# Patient Record
Sex: MALE | NOT HISPANIC OR LATINO | URBAN - METROPOLITAN AREA
[De-identification: names, ages, dates, MRNs, and addresses within clinical notes are randomized per-mention and may not be internally consistent; named-entity substitution may affect disease eponyms.]

---

## 2021-03-18 ENCOUNTER — OFFICE VISIT CONVERTED (OUTPATIENT)
Dept: SURGERY | Facility: CLINIC | Age: 56
End: 2021-03-18
Attending: SURGERY

## 2021-03-19 ENCOUNTER — HOSPITAL ENCOUNTER (OUTPATIENT)
Dept: PREADMISSION TESTING | Facility: HOSPITAL | Age: 56
Discharge: HOME | End: 2021-03-19
Attending: SURGERY

## 2021-03-21 LAB — SARS-COV-2 RNA SPEC QL NAA+PROBE: NOT DETECTED

## 2021-03-25 ENCOUNTER — HOSPITAL ENCOUNTER (OUTPATIENT)
Dept: PERIOP | Facility: HOSPITAL | Age: 56
Setting detail: HOSPITAL OUTPATIENT SURGERY
Discharge: HOME | End: 2021-03-25
Attending: SURGERY

## 2021-04-02 ENCOUNTER — OFFICE VISIT CONVERTED (OUTPATIENT)
Dept: SURGERY | Facility: CLINIC | Age: 56
End: 2021-04-02
Attending: SURGERY

## 2021-05-10 NOTE — H&P
"   History and Physical      Patient Name: Castillo Sandoval   Patient ID: [id number]   Sex: Male   YOB: 1965        Visit Date: March 18, 2021    Provider: Ivan Amor MD   [facility] [facility]   Location Address: [address] [place] [address]  [id number]   Location Phone: [phone]          Chief Complaint  · Outpatient History & Physical / Surgical Orders  · Ganglion cyst      History Of Present Illness     Mr. Sandoval is a 55-year-old male who presents with a ganglion cyst on his right wrist. He noted this occurred without any trauma. He works on his farm.       Past Medical History  ***No Significant Medical History         Past Surgical History  *I have had no surgeries         Allergy List  Codiene       Allergies Reconciled  Family Medical History  *No Known Family History         Social History  Tobacco (Current every day)         Review of Systems  · Cardiovascular  o Denies  o : chest pain on exertion, shortness of breath, lower extremity swelling  · Respiratory  o Denies  o : wheezing, chronic cough, coughing up blood  · Gastrointestinal  o Denies  o : diarrhea, chronic abdominal pain, reflux symptoms      Vitals  Date Time BP Position Site L\R Cuff Size HR RR TEMP (F) WT  HT  BMI kg/m2 BSA m2 O2 Sat FR L/min FiO2 HC       03/18/2021 01:32 PM       14  174lbs 16oz 5'  9\" 25.84 1.97             Physical Examination  · Constitutional  o Appearance  o : reveals patient to be in no acute distress  · Head and Face  o HEENT  o : shows sclera to be nonicteric  · Respiratory  o Respiratory  o : chest is clear  · Cardiovascular  o Heart  o : regular rate and rhythm without murmurs, gallops or rubs  · Gastrointestinal  o Abdominal Examination  o :   § Abdomen  § : abdomen is soft and nontender, bowel sounds are present, no masses, gaurding or rebound were noted   · Musculoskeletal  o Extremeties/Joint  o : extremities show a ful range of motion  · Neurologic  o Neurologic/Reflexes  o : " intact          Assessment  · Pre-op testing     V72.84/Z01.818  · Ganglion cyst     727.43/M67.40  Right Wrist      Plan  · Orders  o [facility] Pre-Op Covid-19 Screening (72060) - V72.84/Z01.818 - 03/19/2021   3/19/21 @12:30pm. [address]  o Surgery Order ([facility]) - 727.43/M67.40 - 03/25/2021  · Medications  o Medications have been Reconciled  o Transition of Care or Provider Policy  · Instructions  o PLAN:  o Handouts Provided-Pre-Procedure Instructions including date and time and location of procedure.  o Surgical Facility: [facility]  o ****Surgical Orders****  o RISK AND BENEFITS:  o Consent for surgery: Given these options, the patient has verbally expressed an understanding of the risks of surgery and finds these risks acceptable. We will proceed with surgery as soon as possible.  o Consult Anesthesia for any post operative block, or any pain management procedure deemed necessary by the anesthesiologist for adequate post-operative pain control.  o O.R. PREP: Per protocol  o PLEASE SIGN PERMIT FOR: Excision of Right Wrist Ganglion Cyst  o Kefzol 1 gram IV on call to OR.  o The above History and Physical Examination has been completed within 30 days of admission.  o ****Patient Status****  o Outpatient  o Pre-Admission Testing Date: Phone Screen 3/23/21 @12:30pm  o Electronically Identified Patient Education Materials Provided Electronically            Electronically Signed by: Anum Anderson-, -Author on March 23, 2021 09:34:03 AM  Electronically Co-signed by: Ivan Amor MD -Reviewer on March 25, 2021 08:41:13 AM

## 2021-05-14 VITALS — RESPIRATION RATE: 14 BRPM | HEIGHT: 69 IN | BODY MASS INDEX: 25.92 KG/M2 | WEIGHT: 175 LBS

## 2021-05-14 VITALS — WEIGHT: 175 LBS | BODY MASS INDEX: 25.92 KG/M2 | HEIGHT: 69 IN

## 2021-05-14 NOTE — PROGRESS NOTES
"   Progress Note      Patient Name: Castillo Sandoval   Patient ID: [id number]   Sex: Male   YOB: 1965    Primary Care Provider: Ignacia Robles MD    Visit Date: April 2, 2021    Provider: Ivan Amor MD   Location: [facility]   Location Address: [address] [place] [address]  [id number]   Location Phone: [phone]          Chief Complaint  · Follow Up Surgery      History Of Present Illness     Mr. Sandoval is seen in follow-up status post right wrist exploration for a ganglion cyst.       Past Medical History  ***No Significant Medical History         Past Surgical History  *I have had no surgeries         Allergy List  Codiene         Family Medical History  *No Known Family History         Social History  Tobacco (Current every day)         Review of Systems  · Cardiovascular  o Denies  o : chest pain on exertion, shortness of breath, lower extremity swelling  · Respiratory  o Denies  o : wheezing, chronic cough, coughing up blood  · Gastrointestinal  o Denies  o : diarrhea, chronic abdominal pain, reflux symptoms      Vitals  Date Time BP Position Site L\R Cuff Size HR RR TEMP (F) WT  HT  BMI kg/m2 BSA m2 O2 Sat FR L/min FiO2 HC       04/02/2021 01:30 PM         174lbs 16oz 5'  9\" 25.84 1.97             Physical Examination     His swelling is down. His sutures were removed.           Assessment  · Encounter for examination following surgery     V67.00/Z09      Plan  · Medications  o Medications have been Reconciled  o Transition of Care or Provider Policy  · Instructions  o F/U PRN  o Electronically Identified Patient Education Materials Provided Electronically            Electronically Signed by: Anum Anderson-, -Author on April 7, 2021 03:04:20 PM  Electronically Co-signed by: Ivan Amor MD -Reviewer on April 8, 2021 04:04:34 PM  "